# Patient Record
Sex: MALE | Race: BLACK OR AFRICAN AMERICAN | Employment: UNEMPLOYED | URBAN - METROPOLITAN AREA
[De-identification: names, ages, dates, MRNs, and addresses within clinical notes are randomized per-mention and may not be internally consistent; named-entity substitution may affect disease eponyms.]

---

## 2020-10-19 ENCOUNTER — HOSPITAL ENCOUNTER (EMERGENCY)
Age: 40
Discharge: HOME | End: 2020-10-19

## 2020-10-19 VITALS
WEIGHT: 189.15 LBS | RESPIRATION RATE: 16 BRPM | DIASTOLIC BLOOD PRESSURE: 90 MMHG | TEMPERATURE: 97.9 F | SYSTOLIC BLOOD PRESSURE: 167 MMHG | BODY MASS INDEX: 26.48 KG/M2 | OXYGEN SATURATION: 99 % | HEART RATE: 86 BPM | HEIGHT: 71 IN

## 2020-10-19 LAB — URINE TRICHOMONAS EVALUATION: NORMAL

## 2020-10-19 PROCEDURE — 99283 EMERGENCY DEPT VISIT LOW MDM: CPT

## 2020-10-19 PROCEDURE — 87491 CHLMYD TRACH DNA AMP PROBE: CPT

## 2020-10-19 PROCEDURE — 81015 MICROSCOPIC EXAM OF URINE: CPT

## 2020-10-19 PROCEDURE — 96372 THER/PROPH/DIAG INJ SC/IM: CPT

## 2020-10-19 PROCEDURE — 6370000000 HC RX 637 (ALT 250 FOR IP): Performed by: PHYSICIAN ASSISTANT

## 2020-10-19 PROCEDURE — 6360000002 HC RX W HCPCS: Performed by: PHYSICIAN ASSISTANT

## 2020-10-19 PROCEDURE — 87591 N.GONORRHOEAE DNA AMP PROB: CPT

## 2020-10-19 RX ORDER — CEFTRIAXONE SODIUM 250 MG/1
250 INJECTION, POWDER, FOR SOLUTION INTRAMUSCULAR; INTRAVENOUS ONCE
Status: COMPLETED | OUTPATIENT
Start: 2020-10-19 | End: 2020-10-19

## 2020-10-19 RX ORDER — AZITHROMYCIN 250 MG/1
1000 TABLET, FILM COATED ORAL ONCE
Status: COMPLETED | OUTPATIENT
Start: 2020-10-19 | End: 2020-10-19

## 2020-10-19 RX ADMIN — CEFTRIAXONE SODIUM 250 MG: 250 INJECTION, POWDER, FOR SOLUTION INTRAMUSCULAR; INTRAVENOUS at 21:15

## 2020-10-19 RX ADMIN — AZITHROMYCIN 1000 MG: 250 TABLET, FILM COATED ORAL at 21:39

## 2020-10-20 NOTE — ED NOTES
No problem after IM antibiotic given. Discharge instructions with pt. No pain. STD teaching with pt.   [facility] STD clinic info given. Explained importance of having a family doctor for regular medical care. Explained how to get a family doctor. [place] clinic info sheet given. [facility] clinic list given. [facility] referral line given. [facility] Ach[facility]a , phone number listed in case pt needs any further assistance.      Ronak Sparks, XIAO  10/20/20 Eric King RN  10/20/20 1095

## 2020-10-20 NOTE — ED PROVIDER NOTES
**ADVANCED PRACTICE PROVIDER, I HAVE EVALUATED THIS PATIENT**        [facility] ENCOUNTER      Pt Name: Nathan López  BPD:[id number]  [place] 1980  Date of evaluation: 10/19/2020  Provider: Tanika Burnett PA-C      Chief Complaint:    Chief Complaint   Patient presents with    Dysuria     pt is having painful urination x4 days       Nursing Notes, Past Medical Hx, Past Surgical Hx, Social Hx, Allergies, and Family Hx were all reviewed and agreed with or any disagreements were addressed in the HPI.    HPI:  (Location, Duration, Timing, Severity, Quality, Assoc Sx, Context, Modifying factors)  This is a  46 y.o. male presents to the emergency room complaining of burning with urination for the past 4 days and penile discharge was started today. Denies any genital sores or lesions he denies any fevers or chills. Denies any nausea or vomiting. He denies chest pain shortness of breath unexpected weight loss weight gain, rash, or night sweats. He states he has had one long-term partner. He did state however this female partner advised him today that he was she was positive for gonorrhea and chlamydia. He is concerned for an STD. Nothing makes his symptoms better or worse. Symptoms do not radiate. PastMedical/Surgical History:  History reviewed. No pertinent past medical history. History reviewed. No pertinent surgical history.     Medications:  Previous Medications    No medications on file         Review of Systems:  REVIEW OF SYSTEMS   Constitutional:   Denies fever or chills    Eyes:  Denies vision changes    HENT:   Denies Sore throat, congestion, neck pain, ear pain   Respiratory:   Denies cough or shortness of breath    Cardiovascular:  Denies chest pain    : As stated in HPI   GI:   Denies abdominal pain, nausea,vomiting, or diarrhea     Musculoskeletal:   Denies back pain    Skin:   Denies rash, swelling, or wound    Endocrine: Denies weight gain or weight loss    Neurologic:   Denies paresthesia or weakness        Physical Exam:  Physical Exam  Vitals signs reviewed. Constitutional:       Appearance: Normal appearance. He is well-developed. HENT:      Head: Normocephalic and atraumatic. Eyes:      Extraocular Movements: Extraocular movements intact. Pupils: Pupils are equal, round, and reactive to light. Cardiovascular:      Rate and Rhythm: Normal rate and regular rhythm. Pulses: Normal pulses. Heart sounds: Normal heart sounds. No murmur. No friction rub. No gallop. Pulmonary:      Effort: Pulmonary effort is normal.      Breath sounds: Normal breath sounds. Genitourinary:     Comments: deferred  Musculoskeletal: Normal range of motion. Skin:     General: Skin is warm and dry. Neurological:      General: No focal deficit present. Mental Status: He is alert and oriented to person, place, and time. Psychiatric:         Mood and Affect: Mood normal.         Behavior: Behavior normal.         MEDICAL DECISION MAKING    Vitals:    Vitals:    10/19/20 2044   BP: (!) 167/90   Pulse: 86   Resp: 16   Temp: 97.9 °F (36.6 °C)   TempSrc: Infrared   SpO2: 99%   Weight: 189 lb 2.5 oz (85.8 kg)   Height: 5' 11\" (1.803 m)       LABS:  Labs Reviewed   [address] [facility], URINE   URINE TRICHOMONAS EVALUATION    Narrative:     Performed at:  [facility]  [address] [facility]   Phone [phone] of labs reviewed and werenegative at this time or not returned at the time of this note.     RADIOLOGY:   Non-plain film images such as CT, Ultrasound and MRI are read by the radiologist. Berhane Levi PA-C have directly visualized the radiologic plain film image(s) with the below findings:        Interpretation per the Radiologist below, if available at the time of this note:    No orders to display        No results found.       MEDICAL DECISION MAKING / ED COURSE:        None    Patient was given:  Medications   cefTRIAXone (ROCEPHIN) injection 250 mg (has no administration in time range)   azithromycin (ZITHROMAX) tablet 1,000 mg (has no administration in time range)     The patient was treated in the ER for possible as the exposure with IM Rocephin and PO azithromycin  This patient was told to have an outpatient HIV and Syphilis test (to be ordered by the follow-up doctor). Differential diagnosis:   Chlamydia/Gonorrhea that could cause Epididymitis, Epididymo-orchitis, Prostatitis, or even a Yanas syndrome from Chlamydia, GC arthritis, Trichomonas, Herpes genitalia, Venereal Warts (condyloma acuminata),  Syphilis (Primary-a painless hard chancre after an incubation period of 2-12 weeks, secondary-6-8 weeks after the appearance of the initial chancre, latent-asymptomatic phase, then tertiary which present as Gummas in any organ: 1-10 years after initial infection, Cardiovascular: 10-40 years after initial infection, Neurosyphilis: 5-35 years after infection),   HIV/AIDS (and the many other sequelae of this disease),   Chancroid (Haemophilus ducreyi), Lymphogranuloma venereum or LGV (from Chlamydia trachomatis, Relatively rare in the [place], causing the infamous [pseudo]\"Bubo\"), Granuloma inguinale (from Klebsiella granulomatis, also called lupoid ulceration granuloma of the pudenda and granuloma contagiosa (Extremely rare in the [place]). The patient tolerated their visit well. I evaluated the patient. The physician was available for consultation as needed. The patient and / or the family were informed of the results of any tests, a time was given to answer questions, a plan was proposed and they agreed with plan. CLINICAL IMPRESSION:  1. Urethritis    2. Possible exposure to STD        DISPOSITION Decision To Discharge 10/19/2020 09:09:42 PM      PATIENT REFERRED TO:  No follow-up provider specified.     DISCHARGE MEDICATIONS:  New Prescriptions    No medications on file       DISCONTINUED MEDICATIONS:  Discontinued Medications    ONDANSETRON (ZOFRAN ODT) 4 MG DISINTEGRATING TABLET    Take 1 tablet by mouth every 8 hours as needed for Nausea              (Please note the MDM and HPI sections of this note were completed with a voice recognition program.  Efforts were made to edit the dictations but occasionally words are mis-transcribed.)    Electronically signed, Duarte Dorsey PA-C  10/19/20 0293

## 2020-10-20 NOTE — ED NOTES
meds ordered explained and given. No pain.    No azithromycin 500mg tabs available-pharmacy to change order to give four 250mg tabd     Jarrod Rashid RN  10/20/20 0001

## 2020-10-21 LAB
C. TRACHOMATIS DNA ,URINE: POSITIVE
N. GONORRHOEAE DNA, URINE: NEGATIVE

## 2020-10-22 NOTE — RESULT ENCOUNTER NOTE
Patient's positive result has been appropriately evaluated by the provider pool. Patient was contacted and notified of the change in treatment plan.   Pt was informed of positive result
yes

## 2022-01-03 ENCOUNTER — APPOINTMENT (OUTPATIENT)
Dept: CT IMAGING | Age: 42
End: 2022-01-03

## 2022-01-03 ENCOUNTER — HOSPITAL ENCOUNTER (EMERGENCY)
Age: 42
Discharge: HOME | End: 2022-01-03
Attending: STUDENT IN AN ORGANIZED HEALTH CARE EDUCATION/TRAINING PROGRAM

## 2022-01-03 VITALS
BODY MASS INDEX: 27.19 KG/M2 | WEIGHT: 194.22 LBS | OXYGEN SATURATION: 100 % | DIASTOLIC BLOOD PRESSURE: 81 MMHG | TEMPERATURE: 98.4 F | RESPIRATION RATE: 16 BRPM | HEIGHT: 71 IN | HEART RATE: 75 BPM | SYSTOLIC BLOOD PRESSURE: 120 MMHG

## 2022-01-03 DIAGNOSIS — R59.0 SUPRACLAVICULAR LYMPHADENOPATHY: Primary | ICD-10-CM

## 2022-01-03 LAB
A/G RATIO: 1.5 (ref 1.1–2.2)
ALBUMIN SERPL-MCNC: 4.2 G/DL (ref 3.4–5)
ALP BLD-CCNC: 106 U/L (ref 40–129)
ALT SERPL-CCNC: 8 U/L (ref 10–40)
ANION GAP SERPL CALCULATED.3IONS-SCNC: 14 MMOL/L (ref 3–16)
AST SERPL-CCNC: 18 U/L (ref 15–37)
BASOPHILS ABSOLUTE: 0.1 K/UL (ref 0–0.2)
BASOPHILS RELATIVE PERCENT: 0.7 %
BILIRUB SERPL-MCNC: 0.7 MG/DL (ref 0–1)
BUN BLDV-MCNC: 9 MG/DL (ref 7–20)
CALCIUM SERPL-MCNC: 9 MG/DL (ref 8.3–10.6)
CHLORIDE BLD-SCNC: 103 MMOL/L (ref 99–110)
CO2: 22 MMOL/L (ref 21–32)
CREAT SERPL-MCNC: 1 MG/DL (ref 0.9–1.3)
EOSINOPHILS ABSOLUTE: 0 K/UL (ref 0–0.6)
EOSINOPHILS RELATIVE PERCENT: 0.3 %
GFR AFRICAN AMERICAN: >60
GFR NON-AFRICAN AMERICAN: >60
GLUCOSE BLD-MCNC: 96 MG/DL (ref 70–99)
HCT VFR BLD CALC: 43.4 % (ref 40.5–52.5)
HEMOGLOBIN: 14.9 G/DL (ref 13.5–17.5)
LYMPHOCYTES ABSOLUTE: 3.3 K/UL (ref 1–5.1)
LYMPHOCYTES RELATIVE PERCENT: 30.6 %
MAGNESIUM: 1.9 MG/DL (ref 1.8–2.4)
MCH RBC QN AUTO: 32.5 PG (ref 26–34)
MCHC RBC AUTO-ENTMCNC: 34.4 G/DL (ref 31–36)
MCV RBC AUTO: 94.4 FL (ref 80–100)
MONOCYTES ABSOLUTE: 1.1 K/UL (ref 0–1.3)
MONOCYTES RELATIVE PERCENT: 10.6 %
NEUTROPHILS ABSOLUTE: 6.2 K/UL (ref 1.7–7.7)
NEUTROPHILS RELATIVE PERCENT: 57.8 %
PDW BLD-RTO: 14.1 % (ref 12.4–15.4)
PLATELET # BLD: 272 K/UL (ref 135–450)
PMV BLD AUTO: 6.8 FL (ref 5–10.5)
POTASSIUM REFLEX MAGNESIUM: 3.3 MMOL/L (ref 3.5–5.1)
RBC # BLD: 4.6 M/UL (ref 4.2–5.9)
SODIUM BLD-SCNC: 139 MMOL/L (ref 136–145)
TOTAL PROTEIN: 7 G/DL (ref 6.4–8.2)
WBC # BLD: 10.7 K/UL (ref 4–11)

## 2022-01-03 PROCEDURE — 36415 COLL VENOUS BLD VENIPUNCTURE: CPT

## 2022-01-03 PROCEDURE — 80053 COMPREHEN METABOLIC PANEL: CPT

## 2022-01-03 PROCEDURE — 99283 EMERGENCY DEPT VISIT LOW MDM: CPT

## 2022-01-03 PROCEDURE — 70491 CT SOFT TISSUE NECK W/DYE: CPT

## 2022-01-03 PROCEDURE — 6360000004 HC RX CONTRAST MEDICATION: Performed by: STUDENT IN AN ORGANIZED HEALTH CARE EDUCATION/TRAINING PROGRAM

## 2022-01-03 PROCEDURE — 83735 ASSAY OF MAGNESIUM: CPT

## 2022-01-03 PROCEDURE — 85025 COMPLETE CBC W/AUTO DIFF WBC: CPT

## 2022-01-03 RX ORDER — CEPHALEXIN 500 MG/1
500 CAPSULE ORAL 4 TIMES DAILY
Qty: 28 CAPSULE | Refills: 0 | Status: SHIPPED | OUTPATIENT
Start: 2022-01-03 | End: 2022-01-10

## 2022-01-03 RX ADMIN — IOPAMIDOL 100 ML: 612 INJECTION, SOLUTION INTRAVENOUS at 18:43

## 2022-01-03 ASSESSMENT — PAIN - FUNCTIONAL ASSESSMENT: PAIN_FUNCTIONAL_ASSESSMENT: 0-10

## 2022-01-03 ASSESSMENT — PAIN DESCRIPTION - ORIENTATION: ORIENTATION: RIGHT

## 2022-01-03 ASSESSMENT — PAIN DESCRIPTION - PAIN TYPE: TYPE: ACUTE PAIN

## 2022-01-03 ASSESSMENT — PAIN SCALES - GENERAL
PAINLEVEL_OUTOF10: 7

## 2022-01-03 ASSESSMENT — PAIN DESCRIPTION - DESCRIPTORS: DESCRIPTORS: ACHING

## 2022-01-03 ASSESSMENT — PAIN DESCRIPTION - LOCATION: LOCATION: NECK

## 2022-01-03 NOTE — ED PROVIDER NOTES
Primary Care Physician: No primary care provider on file. Attending Physician: Gregg Marshall MD     History   Chief Complaint   Patient presents with    Neck Pain     R neck/clavical pain x5days, pt concerned that lymph nodes may be swollen        HPI   Moon Martinez  is a 37 y.o. male no medical history who presents this afternoon complaining of swollen lymph nodes around the neck as well as cervical area. Associated with swollen lymph his pain. Patient stated symptoms started 5 days ago. He stated that he is never had symptoms like this in the past.  Denies any URI symptoms Covid-like symptoms or other symptoms associate with systemic infection. History reviewed. No pertinent past medical history. History reviewed. No pertinent surgical history. History reviewed. No pertinent family history. Social History     Socioeconomic History    Marital status:      Spouse name: None    Number of children: None    Years of education: None    Highest education level: None   Occupational History    None   Tobacco Use    Smoking status: Current Every Day Smoker     Types: Cigars    Smokeless tobacco: Never Used    Tobacco comment: 2 cigars daily   Substance and Sexual Activity    Alcohol use: Yes     Comment: twice a week    Drug use: Yes     Types: Marijuana Carlee Richard)     Comment: 10/19/20 marijuana    Sexual activity: Yes     Partners: Female   Other Topics Concern    None   Social History Narrative    None     Social Determinants of Health     Financial Resource Strain:     Difficulty of Paying Living Expenses: Not on file   Food Insecurity:     Worried About Running Out of Food in the Last Year: Not on file    Karma of Food in the Last Year: Not on file   Transportation Needs:     Lack of Transportation (Medical): Not on file    Lack of Transportation (Non-Medical):  Not on file   Physical Activity:     Days of Exercise per Week: Not on file    Minutes of Exercise per Session: Not on file   Stress:     Feeling of Stress : Not on file   Social Connections:     Frequency of Communication with Friends and Family: Not on file    Frequency of Social Gatherings with Friends and Family: Not on file    Attends Zoroastrianism Services: Not on file    Active Member of [address] [place] [address] or Organizations: Not on file    Attends Club or Organization Meetings: Not on file    Marital Status: Not on file   Intimate Partner Violence:     Fear of Current or Ex-Partner: Not on file    Emotionally Abused: Not on file    Physically Abused: Not on file    Sexually Abused: Not on file   Housing Stability:     Unable to Pay for Housing in the Last Year: Not on file    Number of [place] in the Last Year: Not on file    Unstable Housing in the Last Year: Not on file        Review of Systems   10 total systems reviewed and found to be negative unless otherwise noted in HPI     Physical Exam   /86   Pulse 88   Temp 98.4 °F (36.9 °C) (Oral)   Resp 16   Ht 5' 11\" (1.803 m)   Wt 194 lb 3.6 oz (88.1 kg)   SpO2 100%   BMI 27.09 kg/m²      CONSTITUTIONAL: Well appearing, in no acute distress   HEAD: atraumatic, normocephalic   EYES: PERRL, No injection, discharge or scleral icterus. ENT: Moist mucous membranes. NECK: Normal ROM, no palpable cervical lymph nodes  CARDIOVASCULAR: Regular rate and rhythm. No murmurs or gallop. PULMONARY/CHEST: Airway patent. No retractions. Breath sounds clear with good air entry bilaterally. ABDOMEN: Soft, Non-distended and non-tender, without guarding or rebound. SKIN: Acyanotic, warm, dry   MUSCULOSKELETAL: No swelling, tenderness or deformity   NEUROLOGICAL: Awake and oriented x 3. Pulses intact. Grossly nonfocal   Nursing note and vitals reviewed.      ED Course & Medical Decision Making   Medications   iopamidol (ISOVUE-300) 61 % injection 100 mL (100 mLs IntraVENous Given 1/3/22 5729)      Labs Reviewed   COMPREHENSIVE METABOLIC PANEL W/ REFLEX TO MG FOR LOW K - Abnormal; Notable for the following components:       Result Value    Potassium reflex Magnesium 3.3 (*)     ALT 8 (*)     All other components within normal limits    Narrative:     Performed at:  [facility]  [address] [place], [place]   Phone [phone]   CBC WITH AUTO DIFFERENTIAL    Narrative:     Performed at:  [facility]  [address] [place], [place] [facility]   Phone [phone]   MAGNESIUM    Narrative:     Performed at:  [address] [facility]  [address] [place], [place] [facility]   Phone [phone]      CT SOFT TISSUE NECK W CONTRAST    (Results Pending)        PROCEDURES:   Procedures    ASSESSMENT AND PLAN:  [id number], Jaspal Oneill is a 37 y.o. male no medical history who presents this afternoon complaining of swollen lymph nodes around the neck as well as cervical area. Associated with swollen lymph his pain. Patient stated symptoms started 5 days ago. He stated that he is never had symptoms like this in the past.  Denies any URI symptoms Covid-like symptoms or other symptoms associate with systemic infection. On exam patient has palpable lymph nodes on both sides. But nontoxic. Given his presentation I did obtain labs which were normal.  CT scan of the neck has been obtained and pending. I am evaluating for reactive versus malignant lymph nodes/lymphoma. CT scan is pending. If negative patient can be discharged home if positive will treat accordingly. His care has been signed over to Dr. Jade Nesbitt. ClINICAL IMPRESSION:  1.  Swollen lymph nodes        DISPOSITION    Pending CT read    ___________________________________________________________________________________  _________________________________________________________________________________________  This record is transcribed utilizing voice recognition technology. There are inherent limitations in this technology. In addition, there may be limitations in editing of this report. If there are any discrepancies, please contact me directly.         Tushar Gates MD  01/03/22 5062

## 2022-01-03 NOTE — ED TRIAGE NOTES
Patient came to ER with complaints of right neck pain. Patient stated started 4 to 5 days ago. Patient stated noticed swelling on right side of neck by clavicle.

## 2022-01-04 ENCOUNTER — OFFICE VISIT (OUTPATIENT)
Dept: ENT CLINIC | Age: 42
End: 2022-01-04

## 2022-01-04 VITALS
HEART RATE: 85 BPM | BODY MASS INDEX: 26.74 KG/M2 | HEIGHT: 71 IN | WEIGHT: 191 LBS | DIASTOLIC BLOOD PRESSURE: 83 MMHG | TEMPERATURE: 99 F | SYSTOLIC BLOOD PRESSURE: 120 MMHG

## 2022-01-04 DIAGNOSIS — R22.2 SUPRACLAVICULAR MASS: Primary | ICD-10-CM

## 2022-01-04 PROCEDURE — 10021 FNA BX W/O IMG GDN 1ST LES: CPT | Performed by: OTOLARYNGOLOGY

## 2022-01-04 PROCEDURE — 99204 OFFICE O/P NEW MOD 45 MIN: CPT | Performed by: OTOLARYNGOLOGY

## 2022-01-04 RX ORDER — OXYCODONE HYDROCHLORIDE AND ACETAMINOPHEN 5; 325 MG/1; MG/1
1 TABLET ORAL EVERY 4 HOURS PRN
Qty: 20 TABLET | Refills: 0 | Status: SHIPPED | OUTPATIENT
Start: 2022-01-04 | End: 2022-01-09

## 2022-01-04 NOTE — ED PROVIDER NOTES
[place] of Care Note:    I assumed care of this patient at 200 from Dr. Anand Marx, please see Dr Anand Marx note for more detail. Briefly, this pt is a 18-year-old patient with a 2-day history of painful supraclavicular lymphadenopathy. Patient is afebrile with no other systemic lymphadenopathy or other constitutional symptoms other than that the lymph nodes have been tender. Patient's blood work is unremarkable and patient CT scan of the neck reveals no upper mediastinal lymphadenopathy and no definitive lymph nodes are seen in the supraclavicular area although there is considerable infiltration of the supraclavicular fat. Medical decision making: Supraclavicular lymphadenopathy. Patient clinical exam is consistent with bilateral tender supraclavicular lymphadenopathy and patient will be started on Keflex 500 mg 4 times daily. Patient was discussed with Dr. Mohan Carranza from ear nose and throat and a follow-up appointment was made for tomorrow at 1 PM.  Differential diagnosis includes reactive lymphadenopathy versus lymphoma. FINAL IMPRESSION      1.  Supraclavicular lymphadenopathy              Jaclyn Wortyh MD  01/03/22 1958

## 2022-01-04 NOTE — PROGRESS NOTES
CHIEF COMPLAINT: Neck mass    HISTORY OF PRESENT ILLNESS:  37 y.o. male referred by Dr. Blessing Brooks from [id number] [place] [address] Urgent Care who presents with a neck mass bilateral in the supraclavicular fossae. Arose suddenly 4 days ago. Patient woke up with it. No swelling any no swelling under the arms or in the groin. Patient does have night sweats. No throat pain no hoarseness no dysphagia. The masses are tender. He was seen in the emergency department where he was found to be afebrile and laboratory evaluation showed no evidence of leukocytosis or other abnormalities. Imaging was also ordered. PAST MEDICAL HISTORY:   Social History     Tobacco Use   Smoking Status Current Every Day Smoker    Types: Cigars   Smokeless Tobacco Never Used   Tobacco Comment    2 cigars daily                                                    Social History     Substance and Sexual Activity   Alcohol Use Yes    Comment: twice a week                                                    Current Outpatient Medications:     cephALEXin (KEFLEX) 500 MG capsule, Take 1 capsule by mouth 4 times daily for 7 days, Disp: 28 capsule, Rfl: 0                                               No past medical history on file. No past surgical history on file. FAMILY HISTORY: Family history reviewed. Except as noted in history of present illness, there is no pertinent family history      REVIEW OF SYSTEMS:  All pertinent positive and negative review of systems included in HPI. Otherwise, all systems are reviewed and negative.     PHYSICAL EXAMINATION:   GENERAL: wdwn- no acute distress  RESPIRATORY:  No stridor or respiratory distress  COMMUNICATION :  Normal voice  MENTAL STATUS:  Mood and affect normal, oriented X 3  HEAD AND FACE:  No abnormalities of the skin of face or head  EXTERNAL EARS AND NOSE:  Normal pinnae bilateral  FACIAL MUSCLES:  All branches of facial nerve intact  EXTRAOCULAR MUSCLES: Intact with full range of motion  FACE PALPATION:  No tenderness over sinuses. Zygomatic arches and orbital rims intact  OTOSCOPY:  Normal external auditory canals, tympanic membranes, and middle ear spaces  TUNING FORKS: Rinne ++ Garcia midline at 512 Hz  INTRANASAL:  Septum midline, turbinates normal, meati clear. LIPS, TEETH, GINGIVA:  Normal mucosa  PHARYNX:  Normal  NECK: In both supraclavicular fossae there is a hard mass which is tender to palpation it is posterior to the sternocleidomastoid muscle anterior to the trapezius it is mobile. LYMPHATIC:  No cervical adenopathy  SALIVARY GLANDS:  No swelling or masses in the parotid or submandibular salivary glands  THYROID:  No goiter or thyroid masses. LABS REVIEWED:  No abnormalities  CT IMAGES REVIEWED: Fullness in both supraclavicular fossae but no discrete lymphadenopathy. IMPRESSION: Bilateral supraclavicular mass. PLAN: Fine-needle aspiration performed. Skin overlying the left supraclavicular mass is cleaned with Betadine and injected with lidocaine 1% with epinephrine 1: 100,000. An 18-gauge needle is introduced percutaneously into the mass and aspirate is withdrawn. Aspirate sent on slides and in CytoLyt. Percocet 5/325 prescribed for acute pain. FOLLOW-UP: After cytology.

## 2022-01-06 ENCOUNTER — TELEPHONE (OUTPATIENT)
Dept: ENT CLINIC | Age: 42
End: 2022-01-06

## 2022-01-06 DIAGNOSIS — R22.2 SUPRACLAVICULAR MASS: Primary | ICD-10-CM

## 2022-01-06 RX ORDER — PREDNISONE 10 MG/1
TABLET ORAL
Qty: 20 TABLET | Refills: 0 | Status: SHIPPED | OUTPATIENT
Start: 2022-01-06 | End: 2022-01-16

## 2022-01-06 RX ORDER — AMOXICILLIN 500 MG/1
500 CAPSULE ORAL 3 TIMES DAILY
Qty: 30 CAPSULE | Refills: 0 | Status: SHIPPED | OUTPATIENT
Start: 2022-01-06 | End: 2022-01-16

## 2022-01-06 NOTE — TELEPHONE ENCOUNTER
Cytology on the supraclavicular mass is nondiagnostic. The symptoms are recent onset patient and I agreed to treat with amoxicillin 500 mg 3 times daily for 10 days and prednisone 40 mg tapering over 8 days. He will follow up the conclusion of treatment.

## 2022-06-01 ENCOUNTER — HOSPITAL ENCOUNTER (EMERGENCY)
Age: 42
Discharge: HOME | End: 2022-06-01
Attending: EMERGENCY MEDICINE

## 2022-06-01 ENCOUNTER — APPOINTMENT (OUTPATIENT)
Dept: ULTRASOUND IMAGING | Age: 42
End: 2022-06-01

## 2022-06-01 VITALS
DIASTOLIC BLOOD PRESSURE: 96 MMHG | RESPIRATION RATE: 16 BRPM | WEIGHT: 183.86 LBS | SYSTOLIC BLOOD PRESSURE: 148 MMHG | BODY MASS INDEX: 24.9 KG/M2 | OXYGEN SATURATION: 98 % | HEART RATE: 98 BPM | HEIGHT: 72 IN | TEMPERATURE: 98.5 F

## 2022-06-01 DIAGNOSIS — N45.1 EPIDIDYMITIS: Primary | ICD-10-CM

## 2022-06-01 LAB
BACTERIA: ABNORMAL /HPF
BILIRUBIN URINE: NEGATIVE
BLOOD, URINE: ABNORMAL
CLARITY: ABNORMAL
COLOR: YELLOW
EPITHELIAL CELLS, UA: ABNORMAL /HPF (ref 0–5)
GLUCOSE URINE: NEGATIVE MG/DL
KETONES, URINE: NEGATIVE MG/DL
LEUKOCYTE ESTERASE, URINE: ABNORMAL
MICROSCOPIC EXAMINATION: YES
NITRITE, URINE: NEGATIVE
PH UA: 6 (ref 5–8)
PROTEIN UA: ABNORMAL MG/DL
RBC UA: ABNORMAL /HPF (ref 0–4)
SPECIFIC GRAVITY UA: >=1.03 (ref 1–1.03)
URINE REFLEX TO CULTURE: YES
URINE TYPE: ABNORMAL
UROBILINOGEN, URINE: 0.2 E.U./DL
WBC UA: >100 /HPF (ref 0–5)

## 2022-06-01 PROCEDURE — 76870 US EXAM SCROTUM: CPT

## 2022-06-01 PROCEDURE — 87086 URINE CULTURE/COLONY COUNT: CPT

## 2022-06-01 PROCEDURE — 6360000002 HC RX W HCPCS: Performed by: EMERGENCY MEDICINE

## 2022-06-01 PROCEDURE — 96372 THER/PROPH/DIAG INJ SC/IM: CPT

## 2022-06-01 PROCEDURE — 81001 URINALYSIS AUTO W/SCOPE: CPT

## 2022-06-01 PROCEDURE — 99284 EMERGENCY DEPT VISIT MOD MDM: CPT

## 2022-06-01 PROCEDURE — 6370000000 HC RX 637 (ALT 250 FOR IP): Performed by: EMERGENCY MEDICINE

## 2022-06-01 RX ORDER — CEFTRIAXONE 1 G/1
1000 INJECTION, POWDER, FOR SOLUTION INTRAMUSCULAR; INTRAVENOUS ONCE
Status: COMPLETED | OUTPATIENT
Start: 2022-06-01 | End: 2022-06-01

## 2022-06-01 RX ORDER — ACETAMINOPHEN 500 MG
1000 TABLET ORAL ONCE
Status: COMPLETED | OUTPATIENT
Start: 2022-06-01 | End: 2022-06-01

## 2022-06-01 RX ORDER — DOXYCYCLINE HYCLATE 100 MG
100 TABLET ORAL 2 TIMES DAILY
Qty: 20 TABLET | Refills: 0 | Status: SHIPPED | OUTPATIENT
Start: 2022-06-01 | End: 2022-06-11

## 2022-06-01 RX ADMIN — CEFTRIAXONE 1000 MG: 1 INJECTION, POWDER, FOR SOLUTION INTRAMUSCULAR; INTRAVENOUS at 17:44

## 2022-06-01 RX ADMIN — ACETAMINOPHEN 1000 MG: 500 TABLET ORAL at 16:12

## 2022-06-01 ASSESSMENT — PAIN DESCRIPTION - PAIN TYPE: TYPE: ACUTE PAIN

## 2022-06-01 ASSESSMENT — PAIN DESCRIPTION - LOCATION: LOCATION: OTHER (COMMENT)

## 2022-06-01 ASSESSMENT — PAIN SCALES - GENERAL
PAINLEVEL_OUTOF10: 8
PAINLEVEL_OUTOF10: 8

## 2022-06-01 ASSESSMENT — PAIN DESCRIPTION - DESCRIPTORS: DESCRIPTORS: SHARP

## 2022-06-01 NOTE — ED PROVIDER NOTES
[facility]  Other (pt states he rolled over in his sleep at 0400 and had acute pain right testicle and groin right next to testicle. pain now at 8.   no OTC pain meds taken. advised NPO.)       HISTORY OF PRESENT ILLNESS  Khloe Martin is a 37 y.o. male who presents to the ED as a transfer from [address] [place] [address] emergency department for acquisition of a scrotal ultrasound. The patient reports that he developed dysuria 1 week ago. Felt as if he was having a UTI. Started drinking cranberry juice. He woke this morning around 4 AM with right-sided testicular pain. It radiates to the right pelvis. It is moderate in intensity. Worse with touch, better with rest.  He denies penile discharge. Denies fever, chills, chest pain, shortness of breath, nausea, vomiting, diarrhea, abdominal pain. Denies engaging in penetrative anal sex. No other complaints, modifying factors or associated symptoms. I have reviewed the following from the nursing documentation:    History reviewed. No pertinent past medical history. History reviewed. No pertinent surgical history. History reviewed. No pertinent family history.   Social History     Socioeconomic History    Marital status:      Spouse name: Not on file    Number of children: Not on file    Years of education: Not on file    Highest education level: Not on file   Occupational History    Not on file   Tobacco Use    Smoking status: Current Every Day Smoker     Types: Cigars    Smokeless tobacco: Never Used    Tobacco comment: 5 cigars daily   Substance and Sexual Activity    Alcohol use: Yes     Comment: once a week    Drug use: Yes     Types: Marijuana Cassandra Goshen)     Comment: 6/1/22 marijuana    Sexual activity: Yes     Partners: Female   Other Topics Concern    Not on file   Social History Narrative    Not on file     Social Determinants of Health     Financial Resource Strain:     Difficulty of Paying Living Expenses: Not on file   Food Insecurity:     Worried About Running Out of Food in the Last Year: Not on file    Ran Out of Food in the Last Year: Not on file   Transportation Needs:     Lack of Transportation (Medical): Not on file    Lack of Transportation (Non-Medical): Not on file   Physical Activity:     Days of Exercise per Week: Not on file    Minutes of Exercise per Session: Not on file   Stress:     Feeling of Stress : Not on file   Social Connections:     Frequency of Communication with Friends and Family: Not on file    Frequency of Social Gatherings with Friends and Family: Not on file    Attends Anabaptist Services: Not on file    Active Member of [address] [place] [address] [employer] or Organizations: Not on file    Attends Club or Organization Meetings: Not on file    Marital Status: Not on file   Intimate Partner Violence:     Fear of Current or Ex-Partner: Not on file    Emotionally Abused: Not on file    Physically Abused: Not on file    Sexually Abused: Not on file   Housing Stability:     Unable to Pay for Housing in the Last Year: Not on file    Number of [place] in the Last Year: Not on file    Unstable Housing in the Last Year: Not on file     Current Facility-Administered Medications   Medication Dose Route Frequency Provider Last Rate Last Admin    cefTRIAXone (ROCEPHIN) injection 1,000 mg  1,000 mg IntraMUSCular Once Harvey Uriarte MD         Current Outpatient Medications   Medication Sig Dispense Refill    doxycycline hyclate (VIBRA-TABS) 100 MG tablet Take 1 tablet by mouth 2 times daily for 10 days 20 tablet 0     No Known Allergies    REVIEW OF SYSTEMS  10 systems reviewed, pertinent positives and negatives per HPI, otherwise noted to be negative. PHYSICAL EXAM  ED Triage Vitals [06/01/22 1313]   BP Temp Temp Source Heart Rate Resp SpO2 Height Weight   (!) 148/96 98.5 °F (36.9 °C) Oral 98 16 98 % 6' (1.829 m) 183 lb 13.8 oz (83.4 kg)     General appearance: Awake and alert. Cooperative. No acute distress. HENT: Normocephalic. Atraumatic. Mucous membranes are moist.  Neck: Supple. Eyes: PERRL. EOMI. Heart/Chest: RRR. No murmurs. Lungs: Respirations unlabored. CTAB. Good air exchange. Speaking comfortably in full sentences. Abdomen: Soft. Non-tender. Non-distended. No rebound or guarding. : Normal external male genitalia. There is no discharge from the penile meatus. There are no external lesions. The testicles are descended bilaterally. There is tenderness of the right testicle. No testicular masses are appreciated. There is no inguinal lymphadenopathy. Musculoskeletal: No extremity edema. No deformity. No tenderness in the extremities. All extremities neurovascularly intact. Skin: Warm and dry. No acute rashes. Neurological: Alert and oriented. CN II-XII intact. Gait normal.  Psychiatric: Mood/affect: normal      LABS  I have reviewed all labs for this visit. Results for orders placed or performed during the hospital encounter of 06/01/22   Urinalysis with Reflex to Culture    Specimen: Urine   Result Value Ref Range    Color, UA Yellow Straw/Yellow    Clarity, UA CLOUDY (A) Clear    Glucose, Ur Negative Negative mg/dL    Bilirubin Urine Negative Negative    Ketones, Urine Negative Negative mg/dL    Specific Gravity, UA >=1.030 1.005 - 1.030    Blood, Urine LARGE (A) Negative    pH, UA 6.0 5.0 - 8.0    Protein, UA TRACE (A) Negative mg/dL    Urobilinogen, Urine 0.2 <2.0 E.U./dL    Nitrite, Urine Negative Negative    Leukocyte Esterase, Urine MODERATE (A) Negative    Microscopic Examination YES     Urine Type Voided     Urine Reflex to Culture Yes    Microscopic Urinalysis   Result Value Ref Range    WBC, UA >100 (A) 0 - 5 /HPF    RBC, UA None seen 0 - 4 /HPF    Epithelial Cells, UA 0-1 0 - 5 /HPF    Bacteria, UA Rare (A) None Seen /HPF       RADIOLOGY  I have reviewed all radiographic studies for this visit.    US SCROTUM AND TESTICLES   Final Result   Right epididymis appears heterogeneous and has increased flow on color   Doppler suggesting right-sided epididymitis      Flow is seen in the testicles      Small cyst or spermatoceles are seen in the right and left epididymis              ED COURSE/MDM  Patient seen and evaluated. Old records reviewed. Labs and imaging reviewed and results discussed with patient/family to extent possible. This is a 29-year-old male presenting with right sided testicular pain. This is occurring in the setting of a week of dysuria with a urinalysis showing concern for urinary tract infection. Overall the presentation favors orchitis/epididymitis. The patient was sent here for acquisition of a scrotal ultrasound which will be facilitated. Pain control with acetaminophen. Scrotal ultrasound reveals evidence of epididymitis. No evidence of torsion. Will administer ceftriaxone and d/c on doxy. Will arrange for a PCP for f/u. Usual strict return precautions for new or worsening symptoms communicated. Is this patient to be included in the SEP-1 Core Measure? No   Exclusion criteria - the patient is NOT to be included for SEP-1 Core Measure due to:  2+ SIRS criteria are not met      During the patient's ED course, the patient was given:  Medications   cefTRIAXone (ROCEPHIN) injection 1,000 mg (has no administration in time range)   acetaminophen (TYLENOL) tablet 1,000 mg (1,000 mg Oral Given 6/1/22 1612)        All questions were answered and the patient/family expressed understanding and agreement with the plan. PROCEDURES  None    CRITICAL CARE  N/A    CLINICAL IMPRESSION  1. Epididymitis        DISPOSITION   discharge     Evelin Hollins MD    Note: This chart was created using voice recognition dictation software. Efforts were made by me to ensure accuracy, however some errors may be present due to limitations of this technology and occasionally words are not transcribed correctly.         Evelin Hollins MD  06/01/22 3 [facility]

## 2022-06-01 NOTE — ED NOTES
Pt now dressed again and ready to go to [address] ED. Pt doesn't have a ride to get there. Hospital paid LYFT arranged for pt to go to [address] ([facility] is ). Pain right testicle/groin at 8. Dr Savanna Olson spoke to Dr Kathrin Mcgill who will be assuming care of pt.         Peggy Jauregui RN  06/01/22 2884

## 2022-06-01 NOTE — ED PROVIDER NOTES
[address] ENCOUNTER      Pt Name: Landry Pacheco  MRN: [id number]  [place] 1980  Date of evaluation: 6/1/2022  Provider: Codey Batista DO    CHIEF COMPLAINT  No chief complaint on file. I wore personal protective equipment when I was in the room the entire time. This includes gloves, N95 mask, face shield, and a glove over my stethoscope for protection. HPI  Landry Pacheco is a 37 y.o. male who presents with right-sided pelvic pain and right testicular pain that started at 4 AM today. He states the pelvic pain started first and then progressed to his testicle. He has had a history of urinary tract infection. He denies any fever chills. Denies any dysuria nocturia hematuria. He denies any discharge. He states it has been irritated when he urinates. Denies any previous history of testicular surgery or abdominal surgeries. Denies a history of appendectomy, cholecystectomy, hernia repairs, or testicular surgeries. He denies any other radiation of his pain. He describes it as severe  ? REVIEW OF SYSTEMS  All systems negative except as noted in the HPI. Reviewed Nurses' notes and concur. No LMP for male patient. PAST MEDICAL HISTORY  No past medical history on file. FAMILY HISTORY  No family history on file. SOCIAL HISTORY   reports that he has been smoking cigars. He has never used smokeless tobacco. He reports current alcohol use. He reports current drug use. Drug: Marijuana Shivam Nettles). SURGICAL HISTORY  No past surgical history on file.     CURRENT MEDICATIONS      ALLERGIES  No Known Allergies      PHYSICAL EXAM  VITAL SIGNS: BP (!) 148/96   Pulse 98   Temp 98.5 °F (36.9 °C) (Oral)   Resp 16   Ht 6' (1.829 m)   Wt 183 lb 13.8 oz (83.4 kg)   SpO2 98%   BMI 24.94 kg/m²    Constitutional: Well-developed, well-nourished, appears normal, nontoxic, activity: Patient appears mildly anxious and mildly in pain but otherwise normal  Abdomen: Soft, mild right lower quadrant abdominal tender with no guarding, no rebound, no rigidity; no distension, no masses, no pulsatile masses, no hepatosplenomegaly, normal bowel sounds. Genitourinary: No testicular swelling, moderate right testicular tenderness with a normal left testicle, circumcised. no discharge, no ulcerations  Skin: Warm, Dry, No erythema, No rash. Back: No tenderness, Full range of motion, No scoliosis. Neurologic: Alert & oriented x 3  Psychiatric: Affect normal, Judgment normal, Mood normal.    LABORATORY  Labs Reviewed   URINALYSIS WITH REFLEX TO CULTURE       COURSE & MEDICAL DECISION MAKING  Pertinent Labs reviewed. (See chart for details)    Vitals:    06/01/22 1313   BP: (!) 148/96   Pulse: 98   Resp: 16   Temp: 98.5 °F (36.9 °C)   TempSrc: Oral   SpO2: 98%   Weight: 183 lb 13.8 oz (83.4 kg)   Height: 6' (1.829 m)       Medications - No data to display    New Prescriptions    No medications on file       Patient remained stable in the ED. I spoke to Dr. Trino Bonds about the patient's testicular pain and need to be sent to [facility] ED for ultrasound of his testicle to rule out torsion. Patient was instructed he needs to go directly to [facility] emergency department for evaluation. We ran a urinalysis here because he had given us a urine so would be available. The patient's blood pressure was found to be elevated according to CMS/Medicare and the Affordable Care Act/ObamaCare criteria. Elevated blood pressure could occur because of pain or anxiety or other reasons and does not mean that they need to have their blood pressure treated or medications otherwise adjusted. However, this could also be a sign that they will need to have their blood pressure treated or medications changed. The patient was instructed to follow up closely with their personal physician to have their blood pressure rechecked.  The patient was instructed to take a list of recent blood pressure readings to their next visit with their personal physician. Patient was transferred by private Lyft in order to expedite his ultrasound and care. (This chart has been completed using [address]. Although attempts have been made to ensure accuracy, words and/or phrases may not be transcribed as intended.)    Patient refused pain medicines at the time of his exam.    IMPRESSION(S):  1. Right testicular pain        ?   Recheck Times: 8930    Diagnostic considerations include but are not limited to:  Testicular torsion, epididymitis, orchitis, epididymo-orchitis, prostatitis, scrotal abscess, scrotal cellulitis, hernia, kidney stone, STD, traumatic testicle/fracures testes, other          Roger Mina DO  06/01/22 6890

## 2022-06-01 NOTE — ED NOTES
pt states he rolled over in his sleep at 0400 and had acute pain right testicle and groin right next to testicle. pain now at 8.   no OTC pain meds taken. advised NPO.      Ghanshyam Joya RN  06/01/22 3685

## 2022-06-01 NOTE — ED NOTES
Patient was given discharge instructions and voices understanding. Patient is to return to ED with any concerning symptoms or needs.         Monserrat Goodrich RN  06/01/22 7229

## 2022-06-02 LAB — URINE CULTURE, ROUTINE: NORMAL

## 2024-03-27 ENCOUNTER — HOSPITAL ENCOUNTER (EMERGENCY)
Age: 44
Discharge: HOME | End: 2024-03-27
Payer: OTHER MISCELLANEOUS

## 2024-03-27 ENCOUNTER — APPOINTMENT (OUTPATIENT)
Dept: GENERAL RADIOLOGY | Age: 44
End: 2024-03-27
Payer: OTHER MISCELLANEOUS

## 2024-03-27 VITALS
OXYGEN SATURATION: 97 % | SYSTOLIC BLOOD PRESSURE: 111 MMHG | DIASTOLIC BLOOD PRESSURE: 86 MMHG | WEIGHT: 182.76 LBS | TEMPERATURE: 98 F | BODY MASS INDEX: 25.59 KG/M2 | HEART RATE: 91 BPM | RESPIRATION RATE: 18 BRPM | HEIGHT: 71 IN

## 2024-03-27 DIAGNOSIS — S80.812A ABRASION OF LEFT LOWER LEG, INITIAL ENCOUNTER: ICD-10-CM
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
## 2024-03-27 DIAGNOSIS — L03.119 CELLULITIS OF LOWER EXTREMITY, UNSPECIFIED LATERALITY: ICD-10-CM

## 2024-03-27 DIAGNOSIS — S80.811A ABRASION OF RIGHT LOWER LEG, INITIAL ENCOUNTER: ICD-10-CM

## 2024-03-27 PROCEDURE — 99284 EMERGENCY DEPT VISIT MOD MDM: CPT

## 2024-03-27 PROCEDURE — 6360000002 HC RX W HCPCS: Performed by: PHYSICIAN ASSISTANT

## 2024-03-27 PROCEDURE — 96372 THER/PROPH/DIAG INJ SC/IM: CPT

## 2024-03-27 PROCEDURE — 73590 X-RAY EXAM OF LOWER LEG: CPT

## 2024-03-27 PROCEDURE — 6370000000 HC RX 637 (ALT 250 FOR IP): Performed by: PHYSICIAN ASSISTANT

## 2024-03-27 RX ORDER — CEPHALEXIN 500 MG/1
500 CAPSULE ORAL ONCE
Status: COMPLETED | OUTPATIENT
Start: 2024-03-27 | End: 2024-03-27

## 2024-03-27 RX ORDER — CEPHALEXIN 500 MG/1
500 CAPSULE ORAL 4 TIMES DAILY
Qty: 28 CAPSULE | Refills: 0 | Status: SHIPPED | OUTPATIENT
Start: 2024-03-27 | End: 2024-04-03

## 2024-03-27 RX ORDER — ACETAMINOPHEN 500 MG
1000 TABLET ORAL EVERY 8 HOURS PRN
Qty: 60 TABLET | Refills: 0 | Status: SHIPPED | OUTPATIENT
Start: 2024-03-27 | End: 2024-04-06

## 2024-03-27 RX ORDER — KETOROLAC TROMETHAMINE 30 MG/ML
30 INJECTION, SOLUTION INTRAMUSCULAR; INTRAVENOUS ONCE
Status: COMPLETED | OUTPATIENT
Start: 2024-03-27 | End: 2024-03-27

## 2024-03-27 RX ORDER — ACETAMINOPHEN 325 MG/1
650 TABLET ORAL ONCE
Status: COMPLETED | OUTPATIENT
Start: 2024-03-27 | End: 2024-03-27

## 2024-03-27 RX ORDER — IBUPROFEN 600 MG/1
600 TABLET ORAL 3 TIMES DAILY PRN
Qty: 30 TABLET | Refills: 0 | Status: SHIPPED | OUTPATIENT
Start: 2024-03-27

## 2024-03-27 RX ADMIN — CEPHALEXIN 500 MG: 500 CAPSULE ORAL at 15:29

## 2024-03-27 RX ADMIN — ACETAMINOPHEN 325MG 650 MG: 325 TABLET ORAL at 15:29

## 2024-03-27 RX ADMIN — KETOROLAC TROMETHAMINE 30 MG: 30 INJECTION, SOLUTION INTRAMUSCULAR at 15:28

## 2024-03-27 ASSESSMENT — PAIN SCALES - GENERAL
PAINLEVEL_OUTOF10: 7
PAINLEVEL_OUTOF10: 5

## 2024-03-27 ASSESSMENT — PAIN - FUNCTIONAL ASSESSMENT: PAIN_FUNCTIONAL_ASSESSMENT: 0-10

## 2024-03-27 ASSESSMENT — LIFESTYLE VARIABLES
HOW MANY STANDARD DRINKS CONTAINING ALCOHOL DO YOU HAVE ON A TYPICAL DAY: PATIENT DOES NOT DRINK
HOW OFTEN DO YOU HAVE A DRINK CONTAINING ALCOHOL: NEVER

## 2024-03-27 ASSESSMENT — PAIN DESCRIPTION - DESCRIPTORS: DESCRIPTORS: SORE

## 2024-03-27 ASSESSMENT — PAIN DESCRIPTION - ORIENTATION: ORIENTATION: RIGHT;LEFT

## 2024-03-27 ASSESSMENT — PAIN DESCRIPTION - LOCATION: LOCATION: LEG

## 2024-03-27 NOTE — ED NOTES
Pharmacy only received tylenol through e scribe RN called in keflex and ibuprofen > spoke with pharmacist

## 2024-03-27 NOTE — ED NOTES
Pt d/c home with AVS no s.s of distress noted script x3 sent to the pharmacy pt denies questions about f/u
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
